# Patient Record
Sex: MALE | Race: WHITE
[De-identification: names, ages, dates, MRNs, and addresses within clinical notes are randomized per-mention and may not be internally consistent; named-entity substitution may affect disease eponyms.]

---

## 2018-03-27 ENCOUNTER — HOSPITAL ENCOUNTER (OUTPATIENT)
Dept: HOSPITAL 88 - CT | Age: 83
End: 2018-03-27
Attending: INTERNAL MEDICINE
Payer: MEDICARE

## 2018-03-27 DIAGNOSIS — S09.90XA: Primary | ICD-10-CM

## 2018-03-27 PROCEDURE — 70450 CT HEAD/BRAIN W/O DYE: CPT

## 2018-03-27 NOTE — DIAGNOSTIC IMAGING REPORT
Exam: Head CT without IV contrast

History:Trauma, fall

Comparison studies:None



Technique:

Axial images were obtained from the skull base to the vertex.

Coronal and sagittal images reconstructed from the axial data.

Intravenous contrast: None



Findings:



Scalp/skull: 

No abnormalities.



Extra-axial spaces: 

No masses.  No fluid collections.



Brain sulci: Mildly prominent.

Ventricles: Mild compensatory dilatation. No hydrocephalus.



Extra axial spaces: A calcified 12 mm lesion along the left frontal falx

anteriorly and inferiorly does not result in significant mass effect on the

adjacent inferior frontal lobe. Working diagnosis is meningioma.



Parenchyma: 

Ill-defined confluent supratentorial white matter hypodensities are nonspecific

but most compatible with small vessel microvascular changes. 

No masses, acute hemorrhage, acute or chronic cortical vascular insults.



Sellar/suprasellar region: No abnormalities.

Craniocervical junction: Patent foramen magnum.  No Chiari one malformation.



Incidental findings:

Atherosclerotic calcifications in the carotid siphons and intradural vertebral

arteries. Small right maxillary sinus retention cyst.



Impression:



No acute abnormalities.



Chronic findings:

1.  Mild generalized volume loss.

2.  Moderate microvascular ischemic changes.

3.  Incidental small anterior parafalcine meningioma.



Signed by: Dr. Jose Escobedo M.D. on 3/27/2018 2:06 PM

## 2018-07-05 ENCOUNTER — HOSPITAL ENCOUNTER (OUTPATIENT)
Dept: HOSPITAL 88 - CT | Age: 83
End: 2018-07-05
Attending: INTERNAL MEDICINE
Payer: MEDICARE

## 2018-07-05 DIAGNOSIS — R91.8: Primary | ICD-10-CM

## 2018-07-05 PROCEDURE — 71250 CT THORAX DX C-: CPT

## 2018-07-05 NOTE — DIAGNOSTIC IMAGING REPORT
PROCEDURE: CT CHEST WITHOUT CONTRAST

CT scan of the chest WITHOUT intravenous contrast, using standard 

protocol.

 

TECHNIQUE:

The chest was scanned utilizing a multidetector helical scanner from 

the apex to the level of the adrenal glands.  No IV or oral contrast 

was administered. Coronal and sagittal multiplanar reformations were 

obtained.

 

DLP:  260.69 mGy-cm

 

COMPARISON: None.

 

INDICATIONS:   LUNG NODULE

     

FINDINGS:

Lines/tubes:  None.

 

Lungs and Airways:  The lungs and airways are normal with no focal 

abnormality demonstrated. Mild emphysematous changes predominantly in 

the left lung base and near the anterior junction line bilaterally. 

Focal bronchiectasis in the medial paraspinal right lower lobe. Diffuse 

ground glass opacity in the left lower lobe has the appearance of 

atelectasis and/or focal air trapping. No discrete pulmonary nodule is 

identified on the right or left side.

 

Pleura: The pleural spaces are clear.

 

Heart and mediastinum:  The thyroid gland is normal. Calcified 

mediastinal granulomas. No significant mediastinal, hilar or axillary 

lymphadenopathy is seen. Diffuse atherosclerotic calcification, 

coronary artery calcification and aortic valve calcification. Left 

hemidiaphragm is elevated. The heart and pericardium are within normal 

limits.

 

Soft tissues: Normal.

 

Abdomen: Limited views of the upper abdomen show no abnormality within 

the visualized liver, spleen, pancreas, or kidneys. The adrenal glands 

are normal. Atherosclerotic vascular calcification present.

 

Bones: There are degenerative changes of the spine with wedging of 

several vertebral bodies anteriorly. Sternotomy wire sutures are 

present.

 

IMPRESSION: 

 

1. No discrete pulmonary nodules or masses.

2. Focal areas of bronchiectasis and ground glass opacities most likely 

representative of atelectasis.

 

 

 

William Whiting D.O.  

Dictated by:  William Whiting D.O. on 7/05/2018 at 12:17     

Electronically approved by:  William Whiting D.O. on 7/05/2018 at 12:17